# Patient Record
Sex: FEMALE | Race: WHITE | NOT HISPANIC OR LATINO | Employment: FULL TIME | URBAN - METROPOLITAN AREA
[De-identification: names, ages, dates, MRNs, and addresses within clinical notes are randomized per-mention and may not be internally consistent; named-entity substitution may affect disease eponyms.]

---
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
## 2024-02-08 ENCOUNTER — CONSULT (OUTPATIENT)
Dept: CARDIOLOGY CLINIC | Facility: CLINIC | Age: 19
End: 2024-02-08
Payer: COMMERCIAL

## 2024-02-08 VITALS
HEART RATE: 79 BPM | OXYGEN SATURATION: 99 % | BODY MASS INDEX: 16.71 KG/M2 | HEIGHT: 66 IN | WEIGHT: 104 LBS | SYSTOLIC BLOOD PRESSURE: 98 MMHG | DIASTOLIC BLOOD PRESSURE: 70 MMHG

## 2024-02-08 DIAGNOSIS — R00.2 PALPITATIONS: Primary | ICD-10-CM

## 2024-02-08 DIAGNOSIS — Z82.49 FAMILY HISTORY OF HYPERTROPHIC CARDIOMYOPATHY: ICD-10-CM

## 2024-02-08 DIAGNOSIS — R07.89 CHEST TIGHTNESS: ICD-10-CM

## 2024-02-08 PROCEDURE — 99204 OFFICE O/P NEW MOD 45 MIN: CPT | Performed by: INTERNAL MEDICINE

## 2024-02-08 NOTE — PROGRESS NOTES
[facility]    CHIEF COMPLAINT: No chief complaint on file.      HPI:  Brooke Cross is a 18 y.o. female no significant past medical history.  She was referred for palpitations and chest tightness.    She does not follow with a primary care provider.  Briefly, she was seen in the emergency department at [facility] on 12/15/2023 for elevated heart rates on her Apple watch.  She was mopping and heart rates went up into the 170s.  She did have some chest discomfort which she describes as difficulty taking a deep breath.  After sitting down her heart rates improved into the 90s.  Her blood work was unremarkable.  ECG demonstrated normal sinus rhythm.      She first noticed elevated heart rates in 2023 on her smart watch.  She reports 3 total episodes since that time including the above-mentioned episode in which she presented to the emergency department. She does admit to very low intake of fluids throughout the day. Episodes of elevated heart rate typically occur after she is standing for prolonged periods of time and doing some type of activity. Episodes are typically associated with lightheadedness.  She denies any visual changes, syncope, chest pain, shortness of breath with exertion, orthopnea, PND, leg edema.    Social history: Non-smoker.  No alcohol.  No illicit drug use.  Family history: Father has HCM -  age 56-57 from ?CHF. 2 paternal uncles also have HCM. She has 6 siblings all from the same father. She does not know if they have been screened for HCM.      The following portions of the patient's history were reviewed and updated as appropriate: allergies, current medications, past family history, past medical history, past social history, past surgical history, and problem list.    SINCE LAST  I REVIEWED WITH THE PATIENT THE INTERIM LABS, TEST RESULTS, CONSULTANT(S) NOTES AND PERFORMED AN INTERIM REVIEW OF HISTORY    No past medical history on file.    No past  "surgical history on file.    Social History     Socioeconomic History    Marital status: Single     Spouse name: Not on file    Number of children: Not on file    Years of education: Not on file    Highest education level: Not on file   Occupational History    Not on file   Tobacco Use    Smoking status: Never    Smokeless tobacco: Never   Substance and Sexual Activity    Alcohol use: Not Currently    Drug use: Never    Sexual activity: Not on file   Other Topics Concern    Not on file   Social History Narrative    Not on file     Social Determinants of Health     Financial Resource Strain: Not on file   Food Insecurity: Not on file   Transportation Needs: Not on file   Physical Activity: Not on file   Stress: Not on file   Social Connections: Not on file   Intimate Partner Violence: Not on file   Housing Stability: Not on file       No family history on file.    No Known Allergies    No current outpatient medications on file.     No current facility-administered medications for this visit.       BP 98/70 (BP Location: Left arm, Patient Position: Sitting, Cuff Size: Child)   Pulse 79   Ht 5' 6\" (1.676 m)   Wt 47.2 kg (104 lb)   SpO2 99%   BMI 16.79 kg/m²     Review of Systems   All other systems reviewed and are negative.      Physical Exam  Vitals reviewed.   Constitutional:       General: She is not in acute distress.     Appearance: Normal appearance. She is well-developed.   HENT:      Head: Normocephalic and atraumatic.   Eyes:      General: No scleral icterus.     Extraocular Movements: Extraocular movements intact.      Conjunctiva/sclera: Conjunctivae normal.   Cardiovascular:      Rate and Rhythm: Normal rate and regular rhythm.      Pulses: Normal pulses.      Heart sounds: Normal heart sounds. No murmur heard.  Pulmonary:      Effort: Pulmonary effort is normal. No respiratory distress.      Breath sounds: Normal breath sounds. No wheezing or rales.   Abdominal:      General: Abdomen is flat. Bowel " "sounds are normal. There is no distension.      Palpations: Abdomen is soft.      Tenderness: There is no abdominal tenderness. There is no guarding.   Musculoskeletal:      Right lower leg: No edema.      Left lower leg: No edema.   Skin:     General: Skin is warm and dry.      Capillary Refill: Capillary refill takes less than 2 seconds.      Coloration: Skin is not jaundiced or pale.   Neurological:      Mental Status: She is alert.   Psychiatric:         Mood and Affect: Mood normal.          Lab Results   Component Value Date    K 4.0 12/15/2023     12/15/2023    CO2 26 12/15/2023    BUN 9 12/15/2023    CREATININE 0.63 12/15/2023    CALCIUM 9.3 12/15/2023       No results found for: \"CHOL\", \"HDL\", \"LDLCALC\", \"TRIG\"    Lab Results   Component Value Date    WBC 6.73 12/15/2023    HGB 13.6 12/15/2023    HCT 38.6 12/15/2023     12/15/2023         Cardiac studies:   ECG  - 12/15/23: NSR, RAD    ASSESSMENT AND PLAN:  Diagnoses and all orders for this visit:    Palpitations  -     Ambulatory Referral to Cardiology  -     Echo complete w/ contrast if indicated; Future    Chest tightness  -     Ambulatory Referral to Cardiology  -     Echo complete w/ contrast if indicated; Future    Family history of hypertrophic cardiomyopathy  -     Echo complete w/ contrast if indicated; Future      18-year-old female with no past medical history who presents today for rapid heart rates.  She describes intermittent episodes of rapid heart rates which occur typically after standing and with some exertion.  They usually occur after a prolonged period of time however, they do seem out of proportion to the amount of physical activity. +poor oral intake of fluids throughout the day. Discussed recommendations to start increasing water and salt intake throughout the day. I also recommended a Holter monitor to assess for heart rate variation and assess for any nocturnal tachycardia and average heart rate.  She would like to " defer Holter monitor at this time. She will continue to monitor heart rates on her smart watch.    Given her family history of HCM, she was agreeable to have an echocardiogram performed.    Charles Strong MD

## 2024-02-08 NOTE — PATIENT INSTRUCTIONS
You were seen today in the Cardiology office for rapid heart rates. Your symptoms maybe related to volume depletion. Given your family history, I do recommend having an ultrasound of the heart (echocardiogram) for screening.     Thank you for choosing [facility].

## 2024-02-16 ENCOUNTER — HOSPITAL ENCOUNTER (OUTPATIENT)
Dept: NON INVASIVE DIAGNOSTICS | Facility: CLINIC | Age: 19
Discharge: HOME | End: 2024-02-16
Payer: COMMERCIAL

## 2024-02-16 VITALS
DIASTOLIC BLOOD PRESSURE: 70 MMHG | BODY MASS INDEX: 16.71 KG/M2 | HEIGHT: 66 IN | SYSTOLIC BLOOD PRESSURE: 98 MMHG | HEART RATE: 80 BPM | WEIGHT: 104 LBS

## 2024-02-16 DIAGNOSIS — R00.2 PALPITATIONS: ICD-10-CM

## 2024-02-16 DIAGNOSIS — Z82.49 FAMILY HISTORY OF HYPERTROPHIC CARDIOMYOPATHY: ICD-10-CM

## 2024-02-16 DIAGNOSIS — R07.89 CHEST TIGHTNESS: ICD-10-CM

## 2024-02-16 LAB
AORTIC ROOT: 2.3 CM
APICAL FOUR CHAMBER EJECTION FRACTION: 67 %
ASCENDING AORTA: 1.9 CM
BSA FOR ECHO PROCEDURE: 1.51 M2
E WAVE DECELERATION TIME: 134 MS
E/A RATIO: 1.54
FRACTIONAL SHORTENING: 36 (ref 28–44)
INTERVENTRICULAR SEPTUM IN DIASTOLE (PARASTERNAL SHORT AXIS VIEW): 0.7 CM
INTERVENTRICULAR SEPTUM: 0.7 CM (ref 0.6–1.1)
LAAS-AP2: 10.3 CM2
LAAS-AP4: 10.7 CM2
LEFT ATRIUM SIZE: 2.8 CM
LEFT ATRIUM VOLUME (MOD BIPLANE): 23 ML
LEFT ATRIUM VOLUME INDEX (MOD BIPLANE): 15.2 ML/M2
LEFT INTERNAL DIMENSION IN SYSTOLE: 3 CM (ref 2.1–4)
LEFT VENTRICULAR INTERNAL DIMENSION IN DIASTOLE: 4.7 CM (ref 3.5–6)
LEFT VENTRICULAR POSTERIOR WALL IN END DIASTOLE: 0.6 CM
LEFT VENTRICULAR STROKE VOLUME: 69 ML
LVSV (TEICH): 69 ML
MV E'TISSUE VEL-SEP: 14 CM/S
MV PEAK A VEL: 0.41 M/S
MV PEAK E VEL: 63 CM/S
MV STENOSIS PRESSURE HALF TIME: 39 MS
MV VALVE AREA P 1/2 METHOD: 5.64
RIGHT ATRIUM AREA SYSTOLE A4C: 8.9 CM2
RIGHT VENTRICLE ID DIMENSION: 2.9 CM
SL CV LEFT ATRIUM LENGTH A2C: 3.3 CM
SL CV LV EF: 60
SL CV PED ECHO LEFT VENTRICLE DIASTOLIC VOLUME (MOD BIPLANE) 2D: 104 ML
SL CV PED ECHO LEFT VENTRICLE SYSTOLIC VOLUME (MOD BIPLANE) 2D: 36 ML
TR MAX PG: 18 MMHG
TR PEAK VELOCITY: 2.1 M/S
TRICUSPID ANNULAR PLANE SYSTOLIC EXCURSION: 1.9 CM
TRICUSPID VALVE PEAK REGURGITATION VELOCITY: 2.11 M/S

## 2024-02-16 PROCEDURE — 93306 TTE W/DOPPLER COMPLETE: CPT | Performed by: INTERNAL MEDICINE

## 2024-02-16 PROCEDURE — 93306 TTE W/DOPPLER COMPLETE: CPT
